# Patient Record
Sex: FEMALE | Race: WHITE | NOT HISPANIC OR LATINO | Employment: FULL TIME | URBAN - METROPOLITAN AREA
[De-identification: names, ages, dates, MRNs, and addresses within clinical notes are randomized per-mention and may not be internally consistent; named-entity substitution may affect disease eponyms.]

---

## 2018-01-22 ENCOUNTER — APPOINTMENT (OUTPATIENT)
Dept: ULTRASOUND IMAGING | Facility: CLINIC | Age: 58
End: 2018-01-22
Attending: EMERGENCY MEDICINE
Payer: COMMERCIAL

## 2018-01-22 ENCOUNTER — APPOINTMENT (OUTPATIENT)
Dept: MRI IMAGING | Facility: CLINIC | Age: 58
End: 2018-01-22
Attending: EMERGENCY MEDICINE
Payer: COMMERCIAL

## 2018-01-22 ENCOUNTER — HOSPITAL ENCOUNTER (EMERGENCY)
Facility: CLINIC | Age: 58
Discharge: HOME | End: 2018-01-22
Attending: STUDENT IN AN ORGANIZED HEALTH CARE EDUCATION/TRAINING PROGRAM | Admitting: STUDENT IN AN ORGANIZED HEALTH CARE EDUCATION/TRAINING PROGRAM
Payer: COMMERCIAL

## 2018-01-22 ENCOUNTER — APPOINTMENT (OUTPATIENT)
Dept: GENERAL RADIOLOGY | Facility: CLINIC | Age: 58
End: 2018-01-22
Attending: EMERGENCY MEDICINE
Payer: COMMERCIAL

## 2018-01-22 VITALS
SYSTOLIC BLOOD PRESSURE: 155 MMHG | HEART RATE: 84 BPM | OXYGEN SATURATION: 96 % | TEMPERATURE: 97.8 F | RESPIRATION RATE: 16 BRPM | WEIGHT: 140 LBS | DIASTOLIC BLOOD PRESSURE: 81 MMHG

## 2018-01-22 DIAGNOSIS — R29.898 WEAKNESS OF LEFT FOOT: ICD-10-CM

## 2018-01-22 DIAGNOSIS — R20.0 NUMBNESS OF LEFT FOOT: ICD-10-CM

## 2018-01-22 PROCEDURE — 99285 EMERGENCY DEPT VISIT HI MDM: CPT | Mod: Z6 | Performed by: EMERGENCY MEDICINE

## 2018-01-22 PROCEDURE — 93971 EXTREMITY STUDY: CPT | Mod: LT

## 2018-01-22 PROCEDURE — 99285 EMERGENCY DEPT VISIT HI MDM: CPT | Mod: 25 | Performed by: EMERGENCY MEDICINE

## 2018-01-22 PROCEDURE — 72148 MRI LUMBAR SPINE W/O DYE: CPT

## 2018-01-22 PROCEDURE — 73610 X-RAY EXAM OF ANKLE: CPT | Mod: LT

## 2018-01-22 NOTE — DISCHARGE INSTRUCTIONS
Return if symptoms worsen or new symptoms develop.  Follow-up with [facility] orthopedic spine this weak for further assessment and care. .  If increasing weakness numbness pain or other symptoms present please return for further assessment and care.

## 2018-01-22 NOTE — ED PROVIDER NOTES
History     Chief Complaint   Patient presents with     Ankle Pain     ankle given out X2 on Sunday. Now decreased strength.      R/o DVT     sent in by chiro for r/o DVT. Nonsmoker, no BCP. Sister with hx of DVT     HPI  Christie Jackson is a 57 year old female with a history of back pain who presents emergency department complaining of ankle weakness and foot numbness.  Patient states she got out of bed early Sunday morning and was going to the bathroom when her ankle gave way.  She was having trouble walking at that point and it gave way once again inverting on her.  Patient states she did not fall she caught herself but began having some numbness in her left calf and lateral aspect of her left foot.  It seems like Kerfoot continually inverts.  She denies any other trauma.  She did not hit her head she denies any neck pain.  She has not had any chest pain or shortness of breath.  Patient has not had back surgery and denies any numbness anywhere else in her body    Problem List:    There are no active problems to display for this patient.       Past Medical History:    No past medical history on file.    Past Surgical History:    No past surgical history on file.    Family History:    No family history on file.    Social History:  Marital Status:   [2]  Social History   Substance Use Topics     Smoking status: Not on file     Smokeless tobacco: Not on file     Alcohol use Not on file        Medications:      No current outpatient prescriptions on file.      Review of Systems   Constitutional: Positive for activity change. Negative for chills and fever.   HENT: Negative for congestion and trouble swallowing.    Eyes: Negative for visual disturbance.   Respiratory: Negative for cough, wheezing and stridor.    Cardiovascular: Negative for chest pain and leg swelling.   Gastrointestinal: Negative for abdominal pain.   Genitourinary: Negative for decreased urine volume and dysuria.   Musculoskeletal: Positive for  back pain. Negative for neck pain and neck stiffness.   Skin: Negative for rash.   Neurological: Positive for weakness and numbness. Negative for dizziness, syncope, facial asymmetry, speech difficulty and headaches.   Psychiatric/Behavioral: Negative for confusion.     Physical Exam   BP: 155/81  Pulse: 84  Temp: 97.8  F (36.6  C)  Resp: 16  Weight: 63.5 kg (140 lb)  SpO2: 96 %  Physical Exam           Physical Exam   Constitutional: She appears well-developed. No distress.   HENT:   Head: Normocephalic.   Mouth/Throat: Oropharynx is clear and moist.   Eyes: Conjunctivae are normal.   Neck: Normal range of motion. Neck supple.   Cardiovascular: Normal rate, regular rhythm, normal heart sounds and intact distal pulses.    No murmur heard.  Pulmonary/Chest: Effort normal and breath sounds normal. She has no wheezes. She has no rales.   Abdominal: Soft. Bowel sounds are normal. There is no tenderness. There is no rebound.   Musculoskeletal: Normal range of motion. She exhibits no tenderness.   Neurological: She is alert. She exhibits normal muscle tone.   Tenderness to palpation of the left lateral malleoli region.  There is numbness along the posterior aspect of the calf and lateral aspect of the left foot.  Patient's has slightly decreased strength in plantarflexion and dorsiflexion of the left ankle.  As compared to the right ankle.  Pulses are symmetrical.  No erythema swelling of the leg calf and ankle.  Patient is able to raise her left leg without difficulty.  She able flex and extend her left toe without significant difficulty.  She has some tenderness to palpation at the sacroiliac joint region in the left.  No midline back tenderness is noted.   Skin: Skin is warm and dry.   Psychiatric: She has a normal mood and affect.   Nursing note and vitals reviewed.         Physical Exam    ED Course     ED Course     Procedures               Critical Care time:  none               Results for orders placed or  performed during the hospital encounter of 01/22/18   Lumbar spine MRI w/o contrast - surgery >10 yrs or none    Narrative    MRI LUMBAR SPINE WITHOUT CONTRAST  1/22/2018  5:35 PM     HISTORY: Left lateral foot numbness and weakness. Symptoms since  Sunday morning at 3:00 AM. Leg pain, tingling, numbness and weakness.    TECHNIQUE: Multiplanar multisequence MRI of the lumbar spine without  contrast.    COMPARISON: None.    FINDINGS: The report is dictated assuming five lumbar-type vertebral  bodies, and radiographic correlation may be necessary. The distal  spinal cord and cauda equina appear normal.    T12-L1: Normal disc, facet joints, spinal canal and neural foramina.     L1-L2: Loss of disc height, mild broad-based posterior disc bulge.  Large anterior vertebral endplate osteophytes with prominent reactive  bone marrow edema in the vertebral endplates anteriorly and inferiorly  in L1 and anteriorly and superiorly in L2 vertebral bodies. No fluid  in the disc space to indicate infection. Schmorl's node in the left  side of the superior endplate of L2. Normal spinal canal, neural  foramina and facet joints.    L2-L3: Loss of disc height, mild circumferential disc bulge, anterior  vertebral endplate osteophytes and posterior vertebral endplate  remodeling. Slight impression on thecal sac. Normal neural foramina  and facet joints.    L3-L4: Loss of disc height, mild circumferential disc bulge, slight  impression on the thecal sac. No focal disc herniation. Normal neural  foramina and facet joints.    L4-L5: Mild circumferential disc bulge and decreased T2 signal in the  disc. Normal spinal canal, neural foramina and facet joints.    L5-S1: Central posterior small disc herniation extending inferior to  the disc level. No neural impingement because of small size of the  thecal sac at this level. Normal neural foramina and facet joints.    Paraspinous soft tissues: Normal.     Bone marrow: Benign hemangioma in the  right side of the L5 vertebral  body superiorly. Benign hemangioma in the right lateral aspect of the  L3 vertebral body. Benign hemangiomas in the left inferior aspect of  the T12 vertebral body.      Impression    IMPRESSION:   1. Multilevel degenerative disc disease as described above. This is  most severe anteriorly at L1 S2 where there is reactive bone marrow  edema in the adjacent vertebral endplates.  2. No significant neural impingement.  3. L5-S1 small central posterior disc herniation with no neural  impingement.  4. Multiple benign hemangiomas in the vertebral bodies.    SAMMI NAJERA MD   US Lower Extremity Venous Duplex Left    Narrative    VENOUS ULTRASOUND LEFT LEG  1/22/2018  5:16 PM     HISTORY: Left calf tenderness, above.    COMPARISON: None.    FINDINGS: Examination of the deep veins with graded compression and  color flow Doppler with spectral wave form analysis shows no evidence  of thrombus in the common femoral vein, femoral vein, popliteal vein  or calf veins. There is no venous insufficiency. No abnormalities were  seen in the lateral calf in the area of clinical concern.      Impression    IMPRESSION: No evidence of deep venous thrombosis.    SHIRLEY ANNE MD   Ankle XR, G/E 3 views, left    Narrative    LEFT ANKLE THREE VIEWS   1/22/2018 5:19 PM     HISTORY: Weakness in ankle.    COMPARISON: None.      Impression    IMPRESSION: Normal.    SAMMI NAJERA MD         Assessment and plan. Records  were reviewed.  X-ray of the ankle was obtained.  Ultrasound of the left lower extremity to rule out a DVT was obtained and a lumbar spine MRI was obtained.  The x-ray revealed no evidence of fracture dislocation or other abnormality.  The ultrasound revealed no evidence of DVT.  Findings were discussed with patient.    Due to her weakness upon dorsiflexion and the complaint of inversion of her ankle and calf an MRI scan of the lumbar spine was obtained.  This revealed multilevel degenerative disc  disease most severe anteriorly at L1 S2 where there is reactive bone marrow edema in the adjacent vertebral endplates there is no significant neural impingement present there is at L5-S1 small central posterior disc herniation with no neural impingement.  Findings were discussed in detail with patient.  No obvious neural sign of her symptoms at this point.  She is not having bowel or bladder dysfunction and is able ambulate.  She does have a ankle brace she is wearing which helps.  I may have the patient follow-up with orthopedic spine over the next few days.  I will treat her with steroids to see if this proves issues but she understands if symptoms worsen or new symptoms develop she immediately needs to return for further assessment and care.  Patient is in agreement with this plan.  We discussed that further workup including CT scan of the head would be warranted if symptoms did not improved or worsened.  She is in agreement with this plan and does not feel this is necessary at this time.     I have reviewed the nursing notes.    I have reviewed the findings, diagnosis, plan and need for follow up with the patient.       There are no discharge medications for this patient.      Final diagnoses:   Weakness of left foot   Numbness of left foot       1/22/2018   [facility] EMERGENCY DEPARTMENT     Thee Shore MD  01/23/18 2398

## 2018-01-22 NOTE — ED AVS SNAPSHOT
[facility] Emergency Department    [address] [address]    Phone:  [phone]    Fax:  [phone]                                       Christie Jackson   MRN: [id number]    Department:  [facility] Emergency Department   Date of Visit:  1/22/2018           After Visit Summary Signature Page     I have received my discharge instructions, and my questions have been answered. I have discussed any challenges I see with this plan with the nurse or doctor.    ..........................................................................................................................................  Patient/Patient Representative Signature      ..........................................................................................................................................  Patient Representative Print Name and Relationship to Patient    ..................................................               ................................................  Date                                            Time    ..........................................................................................................................................  Reviewed by Signature/Title    ...................................................              ..............................................  Date                                                            Time

## 2018-01-22 NOTE — ED NOTES
Complaining of left ankle weakness and numbness. The patient states she has no injury, sudden  Onset at 0300 Sunday morning, falling due to ankle weakness.

## 2018-01-22 NOTE — ED AVS SNAPSHOT
[facility] Emergency Department    [address] [address]    Phone:  [phone]    Fax:  [phone]                                       Christie Jackson   MRN: [id number]    Department:  [facility] Emergency Department   Date of Visit:  1/22/2018           Patient Information     Date Of Birth          1960        Your diagnoses for this visit were:     Weakness of left foot     Numbness of left foot        You were seen by Barrie Joshi DO and Thee Shore MD.      Follow-up Information     Follow up with Millicent Dalton MD.    Specialty:  Family Practice    Why:  As needed    Contact information:    [facility]  [address] [id number] [phone]          Follow up with [facility] Emergency Department.    Specialty:  EMERGENCY MEDICINE    Why:  If symptoms worsen    Contact information:    [address] [id number] [phone]    Additional information:    The medical center is located at   [address] (between [address] in [place], [place] miles north   of [place]).        Discharge Instructions       Return if symptoms worsen or new symptoms develop.  Follow-up with [facility] orthopedic spine this weak for further assessment and care. .  If increasing weakness numbness pain or other symptoms present please return for further assessment and care.    24 Hour Appointment Hotline       To make an appointment at any [facility] clinic, call [phone] ([phone]). If you don't have a family doctor or clinic, we will help you find one. [facility] clinics are conveniently located to serve the needs of you and your family.          ED Discharge Orders     ORTHO  REFERRAL       [facility] is referring you to the Orthopedic  Services at [facility].       The  Representative will assist you in the coordination of your  Orthopedic and Musculoskeletal Care as prescribed by your physician.    The  Representative will call you within 1 business day to help schedule your appointment, or you may contact the  Representative at:    All areas ~ [phone]     Type of Referral : Spine: Lumbar  **Choose Medical Spine Specialist (unless patient was seen by a Medical Spine Specialist within the past 6 months).**  Surgical Evaluation is advised if the patient presents with one or more of the following red flags: Evidence of Spinal Tumor, Infection or Fracture, Cauda Equina Syndrome, Sudden or Progressive Weakness, Loss of Bowel or Bladder Control, or any other documented emergent neurological condition resulting from a Lumbar Spinal Condition. Spine Surgeon        Timeframe requested: 1-2 days    Coverage of these services is subject to the terms and limitations of your health insurance plan.  Please call member services at your health plan with any benefit or coverage questions.      If X-rays, CT or MRI's have been performed, please contact the facility where they were done to arrange for , prior to your scheduled appointment.  Please bring this referral request to your appointment and present it to your specialist.                     Review of your medicines      Notice     You have not been prescribed any medications.            Procedures and tests performed during your visit     Ankle XR, G/E 3 views, left    Lumbar spine MRI w/o contrast - surgery >10 yrs or none    US Lower Extremity Venous Duplex Left      Orders Needing Specimen Collection     None      Pending Results     Date and Time Order Name Status Description    1/22/2018 1607 Ankle XR, G/E 3 views, left Preliminary     1/22/2018 1603 Lumbar spine MRI w/o contrast - surgery >10 yrs or none Preliminary             Pending Culture Results     No orders found from 1/20/2018 to 1/23/2018.            Pending Results Instructions     If you had any lab  results that were not finalized at the time of your Discharge, you can call the ED Lab Result RN at [phone]. You will be contacted by this team for any positive Lab results or changes in treatment. The nurses are available 7 days a week from 10A to 6:30P.  You can leave a message 24 hours per day and they will return your call.        Test Results From Your Hospital Stay        1/22/2018  5:50 PM      Narrative     MRI LUMBAR SPINE WITHOUT CONTRAST  1/22/2018  5:35 PM     HISTORY: Left lateral foot numbness and weakness. Symptoms since  Sunday morning at 3:00 AM. Leg pain, tingling, numbness and weakness.    TECHNIQUE: Multiplanar multisequence MRI of the lumbar spine without  contrast.    COMPARISON: None.    FINDINGS: The report is dictated assuming five lumbar-type vertebral  bodies, and radiographic correlation may be necessary. The distal  spinal cord and cauda equina appear normal.    T12-L1: Normal disc, facet joints, spinal canal and neural foramina.     L1-L2: Loss of disc height, mild broad-based posterior disc bulge.  Large anterior vertebral endplate osteophytes with prominent reactive  bone marrow edema in the vertebral endplates anteriorly and inferiorly  in L1 and anteriorly and superiorly in L2 vertebral bodies. No fluid  in the disc space to indicate infection. Schmorl's node in the left  side of the superior endplate of L2. Normal spinal canal, neural  foramina and facet joints.    L2-L3: Loss of disc height, mild circumferential disc bulge, anterior  vertebral endplate osteophytes and posterior vertebral endplate  remodeling. Slight impression on thecal sac. Normal neural foramina  and facet joints.    L3-L4: Loss of disc height, mild circumferential disc bulge, slight  impression on the thecal sac. No focal disc herniation. Normal neural  foramina and facet joints.    L4-L5: Mild circumferential disc bulge and decreased T2 signal in the  disc. Normal spinal canal, neural foramina and facet  joints.    L5-S1: Central posterior small disc herniation extending inferior to  the disc level. No neural impingement because of small size of the  thecal sac at this level. Normal neural foramina and facet joints.    Paraspinous soft tissues: Normal.     Bone marrow: Benign hemangioma in the right side of the L5 vertebral  body superiorly. Benign hemangioma in the right lateral aspect of the  L3 vertebral body. Benign hemangiomas in the left inferior aspect of  the T12 vertebral body.        Impression     IMPRESSION:   1. Multilevel degenerative disc disease as described above. This is  most severe anteriorly at L1 S2 where there is reactive bone marrow  edema in the adjacent vertebral endplates.  2. No significant neural impingement.  3. L5-S1 small central posterior disc herniation with no neural  impingement.  4. Multiple benign hemangiomas in the vertebral bodies.         1/22/2018  5:42 PM      Narrative     VENOUS ULTRASOUND LEFT LEG  1/22/2018  5:16 PM     HISTORY: Left calf tenderness, above.    COMPARISON: None.    FINDINGS: Examination of the deep veins with graded compression and  color flow Doppler with spectral wave form analysis shows no evidence  of thrombus in the common femoral vein, femoral vein, popliteal vein  or calf veins. There is no venous insufficiency. No abnormalities were  seen in the lateral calf in the area of clinical concern.        Impression     IMPRESSION: No evidence of deep venous thrombosis.    SHIRLEY ANNE MD         1/22/2018  5:22 PM      Narrative     LEFT ANKLE THREE VIEWS   1/22/2018 5:19 PM     HISTORY: Weakness in ankle.    COMPARISON: None.        Impression     IMPRESSION: Normal.                Thank you for choosing [facility]       Thank you for choosing [facility] for your care. Our goal is always to provide you with excellent care. Hearing back from our patients is one way we can continue to improve our services. Please take a few minutes to complete the written  "survey that you may receive in the mail after you visit with us. Thank you!        [employer] Information     [employer] lets you send messages to your doctor, view your test results, renew your prescriptions, schedule appointments and more. To sign up, go to [email or web address] . Click on \"Log in\" on the left side of the screen, which will take you to the Welcome page. Then click on \"Sign up Now\" on the right side of the page.     You will be asked to enter the access code listed below, as well as some personal information. Please follow the directions to create your username and password.     Your access code is: [email or web address]  Expires: 2018  5:59 PM     Your access code will  in 90 days. If you need help or a new code, please call your [facility] clinic or [phone].        Care EveryWhere ID     This is your Care EveryWhere ID. This could be used by other organizations to access your [facility] medical records  [id number]        Equal Access to Services     JOURDAN CALDERON : Hadraúl weiss Sogeovany, waaxda luqadaha, qaybta kaalmareshma chua, yunior maldonado . So [facility] [phone].    ATENCIÓN: Si habla español, tiene a berry disposición servicios gratuitos de asistencia lingüística. Llame al [phone].    We comply with applicable federal civil rights laws and [place] laws. We do not discriminate on the basis of race, color, national origin, age, disability, sex, sexual orientation, or gender identity.            After Visit Summary       This is your record. Keep this with you and show to your community pharmacist(s) and doctor(s) at your next visit.                  "

## 2018-01-23 ASSESSMENT — ENCOUNTER SYMPTOMS
HEADACHES: 0
DIZZINESS: 0
BACK PAIN: 1
COUGH: 0
FEVER: 0
WHEEZING: 0
NECK PAIN: 0
STRIDOR: 0
FACIAL ASYMMETRY: 0
CONFUSION: 0
SPEECH DIFFICULTY: 0
ACTIVITY CHANGE: 1
WEAKNESS: 1
NECK STIFFNESS: 0
ABDOMINAL PAIN: 0
NUMBNESS: 1
DYSURIA: 0
CHILLS: 0
TROUBLE SWALLOWING: 0

## 2020-12-16 ENCOUNTER — VIRTUAL VISIT (OUTPATIENT)
Dept: FAMILY MEDICINE | Facility: OTHER | Age: 60
End: 2020-12-16
Payer: COMMERCIAL

## 2020-12-16 DIAGNOSIS — Z20.822 ENCOUNTER FOR LABORATORY TESTING FOR COVID-19 VIRUS: Primary | ICD-10-CM

## 2020-12-16 PROCEDURE — U0003 INFECTIOUS AGENT DETECTION BY NUCLEIC ACID (DNA OR RNA); SEVERE ACUTE RESPIRATORY SYNDROME CORONAVIRUS 2 (SARS-COV-2) (CORONAVIRUS DISEASE [COVID-19]), AMPLIFIED PROBE TECHNIQUE, MAKING USE OF HIGH THROUGHPUT TECHNOLOGIES AS DESCRIBED BY CMS-2020-01-R: HCPCS | Performed by: FAMILY MEDICINE

## 2020-12-16 PROCEDURE — 99421 OL DIG E/M SVC 5-10 MIN: CPT | Performed by: PREVENTIVE MEDICINE

## 2020-12-16 NOTE — PROGRESS NOTES
"Date: 2020 11:40:46  Clinician: Poncho Arnold  Clinician NPI: [id number]  Patient: Christie Jackson  Patient : 1960  Patient Address: [address] [place] [address]  Patient Phone: [phone]  Visit Protocol: URI  Patient Summary:  Christie is a 60 year old ( : 1960 ) female who initiated a [facility] Visit for COVID-19 (Coronavirus) evaluation and screening. When asked the question \"Please sign me up to receive news, health information and promotions from [facility].\", Christie responded \"Yes\".    When asked when her symptoms started, Christie reported that she does not have any symptoms.   She denies having recent facial or sinus surgery in the past 60 days and taking antibiotic medication in the past month.    Pertinent COVID-19 (Coronavirus) information  Christie does not work or volunteer as healthcare worker or a . In the past 14 days, Christie has not worked or volunteered at a healthcare facility or group living setting.   In the past 14 days, she also has not lived in a congregate living setting.   Christie has had a close contact with a laboratory-confirmed COVID-19 patient in the last 14 days. She was exposed at her work. 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   Christie is not living in the same household with the COVID-19 positive patient. She was in an enclosed space for greater than 15 minutes with the COVID-19 patient.   During the encounter, neither were wearing masks.   Christie has not been tested for COVID-19.   Pertinent medical history  She has not been told by her provider to avoid NSAIDs.   Christie does not get yeast infections when she takes antibiotics.   Christie does not have diabetes. She denies having immunosuppressive conditions (e.g., chemotherapy, HIV, organ transplant, long-term use of steroids or " other immunosuppressive medications, splenectomy). She denies having congestive heart failure and severe COPD. She does not have asthma.   Christie does not need a return to work/school note.   Christie does not smoke or use smokeless tobacco.   Weight: 138 lbs  Reason for repeat visit for the same protocol within 24 hours:  I have directly exposed by a co- worker.  See the History of referred by protocol and completed visits section for details on previous visits (visits currently in queue to be diagnosed will not appear in this section).    MEDICATIONS: No current medications, ALLERGIES: NKDA  Clinician Response:  Dear Christie,   Based on your exposure to COVID-19 (coronavirus), we would like to test you for this virus.  1. Please call [phone] to schedule your visit. Explain that you were referred by [facility] to have a COVID-19 test. Be ready to share your [facility] visit ID number.  * If you need to schedule in [facility] please call [phone] or for [employer] employees please call [phone].   * If you need to schedule in the [place] area please call [phone]. [employer] employees call [phone].   The following will serve as your written order for this COVID Test, ordered by me, for the indication of suspected COVID [Z20.828]: The test will be ordered in [employer], our electronic health record, after you are scheduled. It will show as ordered and authorized by Alejandro Newton MD.  Order: COVID-19 (coronavirus) PCR for ASYMPTOMATIC EXPOSURE testing from [facility].   If you know you have had close contact with someone who tested positive, you should be quarantined for 14 days after this exposure. You should stay in quarantine for the14 days even if the covid test is negative, the optimal time to test after exposure is 5-7 days from the exposure  Quarantine means   What should I do?  For safety, it's very important to follow these rules. Do this for 14 days after the date you were last exposed to the virus..  Stay home and  away from others. Don't go to school or anywhere else. Generally quarantine means staying home from work but there are some exceptions to this. Please contact your workplace.   No hugging, kissing or shaking hands.  Don't let anyone visit.  Cover your mouth and nose with a mask, tissue or washcloth to avoid spreading germs.  Wash your hands and face often. Use soap and water.  What are the symptoms of COVID-19?  The most common symptoms are cough, fever and trouble breathing. Less common symptoms include headache, body aches, fatigue (feeling very tired), chills, sore throat, stuffy or runny nose, diarrhea (loose poop), loss of taste or smell, belly pain, and nausea or vomiting (feeling sick to your stomach or throwing up).  After 14 days, if you have still don't have symptoms, you likely don't have this virus.  If you develop symptoms, follow these guidelines.  If you're normally healthy: Please start another [facility] visit to report your symptoms. Go to [email or web address].  If you have a serious health problem (like cancer, heart failure, an organ transplant or kidney disease): Call your specialty clinic. Let them know that you might have COVID-19.  2. When it's time for your COVID test:  Stay at least 6 feet away from others. (If someone will drive you to your test, stay in the backseat, as far away from the  as you can.)  Cover your mouth and nose with a mask, tissue or washcloth.  Go straight to the testing site. Don't make any stops on the way there or back.  Please note  Caregivers in these groups are at risk for severe illness due to COVID-19:  o People 65 years and older  o People who live in a nursing home or long-term care facility  o People with chronic disease (lung, heart, cancer, diabetes, kidney, liver, immunologic)  o People who have a weakened immune system, including those who:  Are in cancer treatment  Take medicine that weakens the immune system, such as corticosteroids  Had a bone marrow or organ  transplant  Have an immune deficiency  Have poorly controlled HIV or AIDS  Are obese (body mass index of 40 or higher)  Smoke regularly  Where can I get more information?  [facility] -- About COVID-19: [email or web address]  CDC -- What to Do If You're Sick: www.cdc.gov/coronavirus/2019-ncov/about/steps-when-sick.html  CDC -- Ending Home Isolation: www.cdc.gov/coronavirus/2019-ncov/hcp/disposition-in-home-patients.html  CDC -- Caring for Someone: www.cdc.gov/coronavirus/2019-ncov/if-you-are-sick/care-for-someone.html  [facility] -- Interim Guidance for Hospital Discharge to Home: www.[facility].mn./diseases/coronavirus/hcp/hospdischarge.pdf  [facility] clinical trials (COVID-19 research studies): clinicalaffairs.[facility]-clinical-trials  Below are the COVID-19 hotlines at the [place] Department of Health ([facility]). Interpreters are available.  For health questions: Call 694-380-9272 or 1-789.571.2096 (7 a.m. to 7 p.m.)  For questions about schools and childcare: Call 994-304-9206 or 1-393.261.8787 (7 a.m. to 7 p.m.)    Diagnosis: Contact with and (suspected) exposure to other viral communicable diseases  Diagnosis ICD: Z20.828

## 2020-12-17 LAB
SARS-COV-2 RNA SPEC QL NAA+PROBE: NOT DETECTED
SPECIMEN SOURCE: NORMAL

## 2021-01-14 ENCOUNTER — VIRTUAL VISIT (OUTPATIENT)
Dept: FAMILY MEDICINE | Facility: OTHER | Age: 61
End: 2021-01-14
Payer: COMMERCIAL

## 2021-01-14 PROCEDURE — 99421 OL DIG E/M SVC 5-10 MIN: CPT | Performed by: PHYSICIAN ASSISTANT

## 2021-01-14 NOTE — PROGRESS NOTES
"Date: 2021 14:05:21  Clinician: Freddy Villagomez  Clinician NPI: [id number]  Patient: Christie Jackson  Patient : 1960  Patient Address: [address] [place], [place] [id number]  Patient Phone: [phone]  Visit Protocol: URI  Patient Summary:  Christie is a 60 year old ( : 1960 ) female who initiated a [facility] Visit for COVID-19 (Coronavirus) evaluation and screening. When asked the question \"Please sign me up to receive news, health information and promotions from [facility].\", Christie responded \"Yes\".    When asked when her symptoms started, Christie reported that she does not have any symptoms.   She denies having recent facial or sinus surgery in the past 60 days and taking antibiotic medication in the past month.    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 In the past 14 days, Christie has not worked or volunteered at a healthcare facility or group living setting.   In the past 14 days, she also has not lived in a congregate living setting.   Christie has not had a close contact with a laboratory-confirmed COVID-19 patient within the last 14 days.    Christie has been tested for COVID-19.      Date(s) of her COVID-19 test as reported by the patient (free text): About a month ago       Result of COVID-19 test as reported by the patient (free text): Neg       Type of test as reported by the patient (free text): Nasal        Christie has not received a COVID-19 vaccine.   Pertinent medical history  She has not been told by her provider to avoid NSAIDs.   Christie does not get yeast infections when she takes antibiotics.   Christie does not have diabetes. She denies having immunosuppressive conditions (e.g., chemotherapy, HIV, organ transplant, long-term use of steroids or other immunosuppressive medications, splenectomy). She denies having congestive heart failure and severe COPD. She does not have asthma.   Christie does not need a " return to work/school note.   Christie does not smoke or use smokeless tobacco.   Weight: 139 lbs    MEDICATIONS: No current medications, ALLERGIES: NKDA  Clinician Response:  Dear Christie,   Based on your exposure to COVID-19 (coronavirus), we would like to test you for this virus.  1. Please call [phone] to schedule your visit. Explain that you were referred by [facility] to have a COVID-19 test. Be ready to share your [facility] visit ID number.  * If you need to schedule in [facility] please call [phone] or for [employer] employees please call [phone].   * If you need to schedule in the [place] area please call [phone]. [employer] employees call [phone].   The following will serve as your written order for this COVID Test, ordered by me, for the indication of suspected COVID [Z20.828]: The test will be ordered in [employer], our electronic health record, after you are scheduled. It will show as ordered and authorized by Alejandro Newton MD.  Order: COVID-19 (coronavirus) PCR for ASYMPTOMATIC EXPOSURE testing from [facility].   If you know you have had close contact with someone who tested positive, you should be quarantined for 14 days after this exposure. You should stay in quarantine for the14 days even if the covid test is negative, the optimal time to test after exposure is 5-7 days from the exposure  Quarantine means   What should I do?  For safety, it's very important to follow these rules. Do this for 14 days after the date you were last exposed to the virus..  Stay home and away from others. Don't go to school or anywhere else. Generally quarantine means staying home from work but there are some exceptions to this. Please contact your workplace.   No hugging, kissing or shaking hands.  Don't let anyone visit.  Cover your mouth and nose with a mask, tissue or washcloth to avoid spreading germs.  Wash your hands and face often. Use soap and water.  What are the symptoms of COVID-19?  The most common symptoms are  cough, fever and trouble breathing. Less common symptoms include headache, body aches, fatigue (feeling very tired), chills, sore throat, stuffy or runny nose, diarrhea (loose poop), loss of taste or smell, belly pain, and nausea or vomiting (feeling sick to your stomach or throwing up).  After 14 days, if you have still don't have symptoms, you likely don't have this virus.  If you develop symptoms, follow these guidelines.  If you're normally healthy: Please start another [facility] visit to report your symptoms. Go to [email or web address].  If you have a serious health problem (like cancer, heart failure, an organ transplant or kidney disease): Call your specialty clinic. Let them know that you might have COVID-19.  2. When it's time for your COVID test:  Stay at least 6 feet away from others. (If someone will drive you to your test, stay in the backseat, as far away from the  as you can.)  Cover your mouth and nose with a mask, tissue or washcloth.  Go straight to the testing site. Don't make any stops on the way there or back.  Please note  Caregivers in these groups are at risk for severe illness due to COVID-19:  o People 65 years and older  o People who live in a nursing home or long-term care facility  o People with chronic disease (lung, heart, cancer, diabetes, kidney, liver, immunologic)  o People who have a weakened immune system, including those who:  Are in cancer treatment  Take medicine that weakens the immune system, such as corticosteroids  Had a bone marrow or organ transplant  Have an immune deficiency  Have poorly controlled HIV or AIDS  Are obese (body mass index of 40 or higher)  Smoke regularly  Where can I get more information?   [employer] [facility] -- About COVID-19: [email or web address]  CDC -- What to Do If You're Sick: www.cdc.gov/coronavirus/2019-ncov/about/steps-when-sick.html  CDC -- Ending Home Isolation: www.cdc.gov/coronavirus/2019-ncov/hcp/disposition-in-home-patients.html  [facility]  -- Caring for Someone: www.cdc.gov/coronavirus/2019-ncov/if-you-are-sick/care-for-someone.html  [facility] -- Interim Guidance for Hospital Discharge to Home: www.health.[facility].mn.us/diseases/coronavirus/hcp/hospdischarge.pdf  [facility] clinical trials (COVID-19 research studies): clinicalaffairs.[facility]-clinical-trials  Below are the COVID-19 hotlines at the [place] Department of Health ([facility]). Interpreters are available.  For health questions: Call 701-767-6778 or 1-288.466.4281 (7 a.m. to 7 p.m.)  For questions about schools and childcare: Call 153-014-2798 or 1-232.700.2244 (7 a.m. to 7 p.m.)    Diagnosis: Contact with and (suspected) exposure to other viral communicable diseases  Diagnosis ICD: Z20.828

## 2021-01-15 ENCOUNTER — HEALTH MAINTENANCE LETTER (OUTPATIENT)
Age: 61
End: 2021-01-15

## 2021-09-18 ENCOUNTER — HEALTH MAINTENANCE LETTER (OUTPATIENT)
Age: 61
End: 2021-09-18

## 2022-02-27 ENCOUNTER — HEALTH MAINTENANCE LETTER (OUTPATIENT)
Age: 62
End: 2022-02-27

## 2022-05-22 ENCOUNTER — HOSPITAL ENCOUNTER (EMERGENCY)
Facility: CLINIC | Age: 62
Discharge: HOME | End: 2022-05-22
Attending: PHYSICIAN ASSISTANT | Admitting: PHYSICIAN ASSISTANT
Payer: COMMERCIAL

## 2022-05-22 VITALS
HEART RATE: 90 BPM | RESPIRATION RATE: 14 BRPM | TEMPERATURE: 98 F | SYSTOLIC BLOOD PRESSURE: 148 MMHG | OXYGEN SATURATION: 97 % | DIASTOLIC BLOOD PRESSURE: 79 MMHG | WEIGHT: 138 LBS

## 2022-05-22 DIAGNOSIS — N39.0 URINARY TRACT INFECTION: ICD-10-CM

## 2022-05-22 LAB
ALBUMIN UR-MCNC: NEGATIVE MG/DL
APPEARANCE UR: ABNORMAL
BACTERIA #/AREA URNS HPF: ABNORMAL /HPF
BILIRUB UR QL STRIP: NEGATIVE
COLOR UR AUTO: YELLOW
GLUCOSE UR STRIP-MCNC: NEGATIVE MG/DL
HGB UR QL STRIP: ABNORMAL
KETONES UR STRIP-MCNC: NEGATIVE MG/DL
LEUKOCYTE ESTERASE UR QL STRIP: ABNORMAL
MUCOUS THREADS #/AREA URNS LPF: PRESENT /LPF
NITRATE UR QL: NEGATIVE
PH UR STRIP: 7 [PH] (ref 5–7)
RBC URINE: 1 /HPF
SP GR UR STRIP: 1 (ref 1–1.03)
SQUAMOUS EPITHELIAL: <1 /HPF
UROBILINOGEN UR STRIP-MCNC: NORMAL MG/DL
WBC CLUMPS #/AREA URNS HPF: PRESENT /HPF
WBC URINE: 115 /HPF

## 2022-05-22 PROCEDURE — 99213 OFFICE O/P EST LOW 20 MIN: CPT | Performed by: PHYSICIAN ASSISTANT

## 2022-05-22 PROCEDURE — G0463 HOSPITAL OUTPT CLINIC VISIT: HCPCS | Performed by: PHYSICIAN ASSISTANT

## 2022-05-22 PROCEDURE — 81001 URINALYSIS AUTO W/SCOPE: CPT | Performed by: PHYSICIAN ASSISTANT

## 2022-05-22 PROCEDURE — 87086 URINE CULTURE/COLONY COUNT: CPT | Performed by: PHYSICIAN ASSISTANT

## 2022-05-22 RX ORDER — HYDROCHLOROTHIAZIDE 25 MG/1
TABLET ORAL
COMMUNITY
Start: 2022-04-15

## 2022-05-22 RX ORDER — LISINOPRIL 5 MG/1
TABLET ORAL
COMMUNITY
Start: 2022-04-15

## 2022-05-22 RX ORDER — CIPROFLOXACIN 500 MG/1
500 TABLET, FILM COATED ORAL 2 TIMES DAILY
Qty: 10 TABLET | Refills: 0 | Status: SHIPPED | OUTPATIENT
Start: 2022-05-22

## 2022-05-22 RX ORDER — CETIRIZINE HYDROCHLORIDE 10 MG/1
10 TABLET ORAL
COMMUNITY
Start: 2021-01-07

## 2022-05-22 ASSESSMENT — ENCOUNTER SYMPTOMS
CONSTITUTIONAL NEGATIVE: 1
FEVER: 0
GASTROINTESTINAL NEGATIVE: 1
DYSURIA: 1
FREQUENCY: 1

## 2022-05-22 NOTE — ED PROVIDER NOTES
History     Chief Complaint   Patient presents with     UTI     HPI  Christie Jackson is a 62 year old female who presents with complaints of dysuria and increased urinary urgency and frequency for the past 3 days.  Patient states her symptoms feel consistent with her past urinary tract infections.  She states she had preceding diarrhea that has since resolved prior to her urinary tract infection symptoms starting.  She has prophylactic antibiotics (Bactrim) at home that she started taking and has been taking these daily without improvement.  She states this is an  prescription however.  Denies fevers, chills, nausea, vomiting, abdominal pain, flank pain, or hematuria.      Allergies:  Allergies   Allergen Reactions     Gramineae Pollens Other (See Comments)     Mold        Problem List:    There are no problems to display for this patient.       Past Medical History:    No past medical history on file.    Past Surgical History:    No past surgical history on file.    Family History:    No family history on file.    Social History:  Marital Status:   [2]        Medications:    ciprofloxacin (CIPRO) 500 MG tablet  hydrochlorothiazide (HYDRODIURIL) 25 MG tablet  lisinopril (ZESTRIL) 5 MG tablet  cetirizine (ZYRTEC) 10 MG tablet  omeprazole (PRILOSEC) 20 MG DR capsule          Review of Systems   Constitutional: Negative.  Negative for fever.   Gastrointestinal: Negative.    Genitourinary: Positive for dysuria, frequency and urgency.   Skin: Negative.    All other systems reviewed and are negative.      Physical Exam   BP: (!) 148/79  Pulse: 90  Temp: 98  F (36.7  C)  Resp: 14  Weight: 62.6 kg (138 lb)  SpO2: 97 %      Physical Exam  Constitutional:       General: She is not in acute distress.     Appearance: Normal appearance. She is not ill-appearing, toxic-appearing or diaphoretic.   HENT:      Head: Normocephalic and atraumatic.      Nose: Nose normal.      Mouth/Throat:      Mouth: Mucous membranes are  moist.   Eyes:      Conjunctiva/sclera: Conjunctivae normal.   Pulmonary:      Effort: Pulmonary effort is normal.   Abdominal:      General: There is no distension.      Palpations: Abdomen is soft.      Tenderness: There is no abdominal tenderness. There is no right CVA tenderness, left CVA tenderness or guarding.   Musculoskeletal:         General: Normal range of motion.      Cervical back: Normal range of motion and neck supple.   Skin:     General: Skin is warm and dry.   Neurological:      Mental Status: She is alert.         ED Course                 Procedures    Results for orders placed or performed during the hospital encounter of 05/22/22 (from the past 24 hour(s))   UA with Microscopic reflex to Culture    Specimen: Urine, Midstream   Result Value Ref Range    Color Urine Yellow Colorless, Straw, Light Yellow, Yellow    Appearance Urine Slightly Cloudy (A) Clear    Glucose Urine Negative Negative mg/dL    Bilirubin Urine Negative Negative    Ketones Urine Negative Negative mg/dL    Specific Gravity Urine 1.003 1.003 - 1.035    Blood Urine Large (A) Negative    pH Urine 7.0 5.0 - 7.0    Protein Albumin Urine Negative Negative mg/dL    Urobilinogen Urine Normal Normal, 2.0 mg/dL    Nitrite Urine Negative Negative    Leukocyte Esterase Urine Large (A) Negative    Bacteria Urine Few (A) None Seen /HPF    WBC Clumps Urine Present (A) None Seen /HPF    Mucus Urine Present (A) None Seen /LPF    RBC Urine 1 <=2 /HPF    WBC Urine 115 (H) <=5 /HPF    Squamous Epithelials Urine <1 <=1 /HPF    Narrative    Urine Culture ordered based on laboratory criteria       Medications - No data to display    Assessments & Plan (with Medical Decision Making)     Pt is a 62 year old female who presents with complaints of dysuria and increased urinary urgency and frequency for the past 3 days.  Patient states her symptoms feel consistent with her past urinary tract infections.  She states she had preceding diarrhea that has  since resolved prior to her urinary tract infection symptoms starting.  She has prophylactic antibiotics (Bactrim) at home that she started taking and has been taking these daily without improvement.  No systemic symptoms.    Pt is afebrile on arrival.  Exam as above.  UA is positive for large leuk esterase, WBC clumps, and 115 WBCs.  Urine was sent for culture.  Discussed results with patient.  Return precautions were reviewed.  Hand-outs were provided.    Patient was sent with Ciprofloxacin and was instructed to follow-up with PCP in 3-5 days for continued care and management or sooner if new or worsening symptoms.  She is to return to the ED for persistent and/or worsening symptoms.  Patient expressed understanding of the diagnosis and plan and was discharged home in good condition.    I have reviewed the nursing notes.    I have reviewed the findings, diagnosis, plan and need for follow up with the patient.    Discharge Medication List as of 5/22/2022  6:24 PM      START taking these medications    Details   ciprofloxacin (CIPRO) 500 MG tablet Take 1 tablet (500 mg) by mouth 2 times daily, Disp-10 tablet, R-0, [facility]             Final diagnoses:   Urinary tract infection       5/22/2022   [facility] EMERGENCY DEPT      Disclaimer:  This note consists of symbols derived from keyboarding, dictation and/or voice recognition software.  As a result, there may be errors in the script that have gone undetected.  Please consider this when interpreting information found in this chart.     Yany Cano PA-C  05/22/22 [id number]

## 2022-05-22 NOTE — ED TRIAGE NOTES
History of UTI, frequency and urgency, started old antibiotics prophalactic     Triage Assessment     Row Name 05/22/22 [id number]       Triage Assessment (Adult)    Airway WDL WDL       Respiratory WDL    Respiratory WDL WDL       Skin Circulation/Temperature WDL    Skin Circulation/Temperature WDL WDL

## 2022-05-24 LAB — BACTERIA UR CULT: NORMAL

## 2022-11-19 ENCOUNTER — HEALTH MAINTENANCE LETTER (OUTPATIENT)
Age: 62
End: 2022-11-19

## 2023-04-09 ENCOUNTER — HEALTH MAINTENANCE LETTER (OUTPATIENT)
Age: 63
End: 2023-04-09

## 2024-08-19 ENCOUNTER — HOSPITAL ENCOUNTER (EMERGENCY)
Facility: CLINIC | Age: 64
Discharge: HOME | End: 2024-08-19
Attending: STUDENT IN AN ORGANIZED HEALTH CARE EDUCATION/TRAINING PROGRAM | Admitting: STUDENT IN AN ORGANIZED HEALTH CARE EDUCATION/TRAINING PROGRAM
Payer: COMMERCIAL

## 2024-08-19 ENCOUNTER — APPOINTMENT (OUTPATIENT)
Dept: CT IMAGING | Facility: CLINIC | Age: 64
End: 2024-08-19
Attending: STUDENT IN AN ORGANIZED HEALTH CARE EDUCATION/TRAINING PROGRAM
Payer: COMMERCIAL

## 2024-08-19 VITALS
TEMPERATURE: 98.1 F | BODY MASS INDEX: 23.56 KG/M2 | HEIGHT: 64 IN | RESPIRATION RATE: 13 BRPM | HEART RATE: 91 BPM | DIASTOLIC BLOOD PRESSURE: 82 MMHG | SYSTOLIC BLOOD PRESSURE: 131 MMHG | WEIGHT: 138 LBS | OXYGEN SATURATION: 97 %

## 2024-08-19 DIAGNOSIS — K57.32 DIVERTICULITIS OF COLON: ICD-10-CM

## 2024-08-19 LAB
ALBUMIN SERPL BCG-MCNC: 4.3 G/DL (ref 3.5–5.2)
ALBUMIN UR-MCNC: NEGATIVE MG/DL
ALP SERPL-CCNC: 122 U/L (ref 40–150)
ALT SERPL W P-5'-P-CCNC: 139 U/L (ref 0–50)
ANION GAP SERPL CALCULATED.3IONS-SCNC: 11 MMOL/L (ref 7–15)
APPEARANCE UR: CLEAR
AST SERPL W P-5'-P-CCNC: 111 U/L (ref 0–45)
BASOPHILS # BLD AUTO: 0 10E3/UL (ref 0–0.2)
BASOPHILS NFR BLD AUTO: 0 %
BILIRUB SERPL-MCNC: 0.6 MG/DL
BILIRUB UR QL STRIP: NEGATIVE
BUN SERPL-MCNC: 15.2 MG/DL (ref 8–23)
CALCIUM SERPL-MCNC: 9.7 MG/DL (ref 8.8–10.4)
CHLORIDE SERPL-SCNC: 97 MMOL/L (ref 98–107)
COLOR UR AUTO: YELLOW
CREAT SERPL-MCNC: 0.7 MG/DL (ref 0.51–0.95)
EGFRCR SERPLBLD CKD-EPI 2021: >90 ML/MIN/1.73M2
EOSINOPHIL # BLD AUTO: 0.1 10E3/UL (ref 0–0.7)
EOSINOPHIL NFR BLD AUTO: 1 %
ERYTHROCYTE [DISTWIDTH] IN BLOOD BY AUTOMATED COUNT: 11.9 % (ref 10–15)
GLUCOSE SERPL-MCNC: 143 MG/DL (ref 70–99)
GLUCOSE UR STRIP-MCNC: NEGATIVE MG/DL
HCO3 SERPL-SCNC: 30 MMOL/L (ref 22–29)
HCT VFR BLD AUTO: 42 % (ref 35–47)
HGB BLD-MCNC: 14.1 G/DL (ref 11.7–15.7)
HGB UR QL STRIP: NEGATIVE
HOLD SPECIMEN: NORMAL
IMM GRANULOCYTES # BLD: 0 10E3/UL
IMM GRANULOCYTES NFR BLD: 0 %
INR PPP: 1.03 (ref 0.85–1.15)
KETONES UR STRIP-MCNC: NEGATIVE MG/DL
LEUKOCYTE ESTERASE UR QL STRIP: NEGATIVE
LIPASE SERPL-CCNC: 35 U/L (ref 13–60)
LYMPHOCYTES # BLD AUTO: 1.4 10E3/UL (ref 0.8–5.3)
LYMPHOCYTES NFR BLD AUTO: 15 %
MCH RBC QN AUTO: 31.4 PG (ref 26.5–33)
MCHC RBC AUTO-ENTMCNC: 33.6 G/DL (ref 31.5–36.5)
MCV RBC AUTO: 94 FL (ref 78–100)
MONOCYTES # BLD AUTO: 0.7 10E3/UL (ref 0–1.3)
MONOCYTES NFR BLD AUTO: 7 %
MUCOUS THREADS #/AREA URNS LPF: PRESENT /LPF
NEUTROPHILS # BLD AUTO: 7.1 10E3/UL (ref 1.6–8.3)
NEUTROPHILS NFR BLD AUTO: 76 %
NITRATE UR QL: NEGATIVE
NRBC # BLD AUTO: 0 10E3/UL
NRBC BLD AUTO-RTO: 0 /100
PH UR STRIP: 7 [PH] (ref 5–7)
PLATELET # BLD AUTO: 295 10E3/UL (ref 150–450)
POTASSIUM SERPL-SCNC: 3.3 MMOL/L (ref 3.4–5.3)
PROT SERPL-MCNC: 7.8 G/DL (ref 6.4–8.3)
RBC # BLD AUTO: 4.49 10E6/UL (ref 3.8–5.2)
RBC URINE: 6 /HPF
SODIUM SERPL-SCNC: 138 MMOL/L (ref 135–145)
SP GR UR STRIP: 1.01 (ref 1–1.03)
SQUAMOUS EPITHELIAL: <1 /HPF
UROBILINOGEN UR STRIP-MCNC: NORMAL MG/DL
WBC # BLD AUTO: 9.3 10E3/UL (ref 4–11)
WBC URINE: 1 /HPF

## 2024-08-19 PROCEDURE — 74177 CT ABD & PELVIS W/CONTRAST: CPT

## 2024-08-19 PROCEDURE — 80053 COMPREHEN METABOLIC PANEL: CPT | Performed by: STUDENT IN AN ORGANIZED HEALTH CARE EDUCATION/TRAINING PROGRAM

## 2024-08-19 PROCEDURE — 99284 EMERGENCY DEPT VISIT MOD MDM: CPT | Performed by: STUDENT IN AN ORGANIZED HEALTH CARE EDUCATION/TRAINING PROGRAM

## 2024-08-19 PROCEDURE — 85025 COMPLETE CBC W/AUTO DIFF WBC: CPT | Performed by: STUDENT IN AN ORGANIZED HEALTH CARE EDUCATION/TRAINING PROGRAM

## 2024-08-19 PROCEDURE — 250N000011 HC RX IP 250 OP 636: Performed by: STUDENT IN AN ORGANIZED HEALTH CARE EDUCATION/TRAINING PROGRAM

## 2024-08-19 PROCEDURE — 93010 ELECTROCARDIOGRAM REPORT: CPT | Performed by: STUDENT IN AN ORGANIZED HEALTH CARE EDUCATION/TRAINING PROGRAM

## 2024-08-19 PROCEDURE — 36415 COLL VENOUS BLD VENIPUNCTURE: CPT | Performed by: STUDENT IN AN ORGANIZED HEALTH CARE EDUCATION/TRAINING PROGRAM

## 2024-08-19 PROCEDURE — 85610 PROTHROMBIN TIME: CPT | Performed by: STUDENT IN AN ORGANIZED HEALTH CARE EDUCATION/TRAINING PROGRAM

## 2024-08-19 PROCEDURE — 258N000003 HC RX IP 258 OP 636: Performed by: STUDENT IN AN ORGANIZED HEALTH CARE EDUCATION/TRAINING PROGRAM

## 2024-08-19 PROCEDURE — 81001 URINALYSIS AUTO W/SCOPE: CPT | Performed by: STUDENT IN AN ORGANIZED HEALTH CARE EDUCATION/TRAINING PROGRAM

## 2024-08-19 PROCEDURE — 250N000009 HC RX 250: Performed by: STUDENT IN AN ORGANIZED HEALTH CARE EDUCATION/TRAINING PROGRAM

## 2024-08-19 PROCEDURE — 83690 ASSAY OF LIPASE: CPT | Performed by: STUDENT IN AN ORGANIZED HEALTH CARE EDUCATION/TRAINING PROGRAM

## 2024-08-19 PROCEDURE — 96361 HYDRATE IV INFUSION ADD-ON: CPT | Performed by: STUDENT IN AN ORGANIZED HEALTH CARE EDUCATION/TRAINING PROGRAM

## 2024-08-19 PROCEDURE — 93005 ELECTROCARDIOGRAM TRACING: CPT | Performed by: STUDENT IN AN ORGANIZED HEALTH CARE EDUCATION/TRAINING PROGRAM

## 2024-08-19 PROCEDURE — 96360 HYDRATION IV INFUSION INIT: CPT | Mod: 59 | Performed by: STUDENT IN AN ORGANIZED HEALTH CARE EDUCATION/TRAINING PROGRAM

## 2024-08-19 PROCEDURE — 99285 EMERGENCY DEPT VISIT HI MDM: CPT | Mod: 25 | Performed by: STUDENT IN AN ORGANIZED HEALTH CARE EDUCATION/TRAINING PROGRAM

## 2024-08-19 RX ORDER — IOPAMIDOL 755 MG/ML
68 INJECTION, SOLUTION INTRAVASCULAR ONCE
Status: COMPLETED | OUTPATIENT
Start: 2024-08-19 | End: 2024-08-19

## 2024-08-19 RX ADMIN — IOPAMIDOL 68 ML: 755 INJECTION, SOLUTION INTRAVENOUS at 12:05

## 2024-08-19 RX ADMIN — SODIUM CHLORIDE 1000 ML: 9 INJECTION, SOLUTION INTRAVENOUS at 11:19

## 2024-08-19 RX ADMIN — SODIUM CHLORIDE 57 ML: 9 INJECTION, SOLUTION INTRAVENOUS at 12:05

## 2024-08-19 ASSESSMENT — COLUMBIA-SUICIDE SEVERITY RATING SCALE - C-SSRS
2. HAVE YOU ACTUALLY HAD ANY THOUGHTS OF KILLING YOURSELF IN THE PAST MONTH?: NO
1. IN THE PAST MONTH, HAVE YOU WISHED YOU WERE DEAD OR WISHED YOU COULD GO TO SLEEP AND NOT WAKE UP?: NO
6. HAVE YOU EVER DONE ANYTHING, STARTED TO DO ANYTHING, OR PREPARED TO DO ANYTHING TO END YOUR LIFE?: NO

## 2024-08-19 ASSESSMENT — ACTIVITIES OF DAILY LIVING (ADL)
ADLS_ACUITY_SCORE: 35
ADLS_ACUITY_SCORE: 35

## 2024-08-19 NOTE — ED PROVIDER NOTES
"  History     Chief Complaint   Patient presents with    Abdominal Pain     Lower abdominal pain since Thursday night     HPI  Christie Jackson is a 64 year old female who presents to the department for evaluation of lower abdominal pelvic region pain since Thursday.  Patient explains that overnight she developed severe and constant lower abdominal and pelvic pain bilaterally which has persisted since onset.  Pain seems to worsen with movement and ambulation, she also has an urge to defecate but describes small nonbloody stool passing.  She is without dysuria or hematuria which has been consistent with her urinary tract infections in the past.  She denies headache, dizziness, chest pain, cough, shortness of breath, upper abdominal pain, flank pain, or other concerning features.  She last took acetaminophen yesterday for pain.        Allergies:  Allergies   Allergen Reactions    Gramineae Pollens Other (See Comments)     Mold        Problem List:    Patient Active Problem List    Diagnosis Date Noted    Allergic rhinitis 06/18/2012     Priority: Medium    Essential hypertension 09/04/2007     Priority: Medium        Past Medical History:    No past medical history on file.    Past Surgical History:    No past surgical history on file.    Family History:    No family history on file.    Social History:  Marital Status:   [2]        Medications:    amoxicillin-clavulanate (AUGMENTIN) 875-125 MG tablet  cetirizine (ZYRTEC) 10 MG tablet  ciprofloxacin (CIPRO) 500 MG tablet  hydrochlorothiazide (HYDRODIURIL) 25 MG tablet  lisinopril (ZESTRIL) 5 MG tablet  omeprazole (PRILOSEC) 20 MG DR capsule          Review of Systems   ROS: 10 point ROS neg other than the symptoms noted above in the HPI.    Physical Exam   BP: (!) 160/90  Pulse: 116  Temp: 98.1  F (36.7  C)  Resp: 18  Height: 162.6 cm (5' 4\")  Weight: 62.6 kg (138 lb)  SpO2: 98 %      Physical Exam  Constitutional:  Well developed, well nourished.  Appears nontoxic " and in no acute distress.  Resting comfortably on the gurney.  HENT:  Normocephalic and atraumatic.  Symmetric in appearance.  Eyes:  Conjunctivae are normal.  Cardiovascular:  No cyanosis.  Borderline tachycardia with regular rhythm.  No audible murmurs noted.    Respiratory:  Effort normal without sign of respiratory distress.  No audible wheezing or stridor.  CTAB.   Gastrointestinal:  Soft nondistended abdomen.  Bilateral lower quadrant tenderness with guarding.  No rigidity or rebound tenderness.  Negative Hernandez's sign.  Negative McBurney's point.    Musculoskeletal:  Moves extremities spontaneously.  Neurological:  Patient is alert.  Skin:  Skin is warm and dry.  Psychiatric:  Normal mood and affect.    ED Course        Procedures                EKG Interpretation:      Interpreted by: Barrie Joshi  Time reviewed: Upon completion    Symptoms at time of EKG: Lower abdominal and pelvic pain  Rhythm: Sinus  Rate: 111 bpm  Axis: Normal    Conduction: None atypical   ST Segments/ T Waves: No pathologic ST-elevations or T-wave abnormalities.  Q Waves: None    Clinical Impression: No sign of ischemia            Results for orders placed or performed during the hospital encounter of 08/19/24 (from the past 24 hour(s))   CBC with platelets differential    Narrative    The following orders were created for panel order CBC with platelets differential.  Procedure                               Abnormality         Status                     ---------                               -----------         ------                     CBC with platelets and d...[[id number]]                      Final result                 Please view results for these tests on the individual orders.   INR   Result Value Ref Range    INR 1.03 0.85 - 1.15   Comprehensive metabolic panel   Result Value Ref Range    Sodium 138 135 - 145 mmol/L    Potassium 3.3 (L) 3.4 - 5.3 mmol/L    Carbon Dioxide (CO2) 30 (H) 22 - 29 mmol/L    Anion Gap 11 7 - 15  mmol/L    Urea Nitrogen 15.2 8.0 - 23.0 mg/dL    Creatinine 0.70 0.51 - 0.95 mg/dL    GFR Estimate >90 >60 mL/min/1.73m2    Calcium 9.7 8.8 - 10.4 mg/dL    Chloride 97 (L) 98 - 107 mmol/L    Glucose 143 (H) 70 - 99 mg/dL    Alkaline Phosphatase 122 40 - 150 U/L     (H) 0 - 45 U/L     (H) 0 - 50 U/L    Protein Total 7.8 6.4 - 8.3 g/dL    Albumin 4.3 3.5 - 5.2 g/dL    Bilirubin Total 0.6 <=1.2 mg/dL   Lipase   Result Value Ref Range    Lipase 35 13 - 60 U/L   [place] Draw    Narrative    The following orders were created for panel order [place] Draw.  Procedure                               Abnormality         Status                     ---------                               -----------         ------                     Extra Red Top Tube[[id number]]                               Final result                 Please view results for these tests on the individual orders.   CBC with platelets and differential   Result Value Ref Range    WBC Count 9.3 4.0 - 11.0 10e3/uL    RBC Count 4.49 3.80 - 5.20 10e6/uL    Hemoglobin 14.1 11.7 - 15.7 g/dL    Hematocrit 42.0 35.0 - 47.0 %    MCV 94 78 - 100 fL    MCH 31.4 26.5 - 33.0 pg    MCHC 33.6 31.5 - 36.5 g/dL    RDW 11.9 10.0 - 15.0 %    Platelet Count 295 150 - 450 10e3/uL    % Neutrophils 76 %    % Lymphocytes 15 %    % Monocytes 7 %    % Eosinophils 1 %    % Basophils 0 %    % Immature Granulocytes 0 %    NRBCs per 100 WBC 0 <1 /100    Absolute Neutrophils 7.1 1.6 - 8.3 10e3/uL    Absolute Lymphocytes 1.4 0.8 - 5.3 10e3/uL    Absolute Monocytes 0.7 0.0 - 1.3 10e3/uL    Absolute Eosinophils 0.1 0.0 - 0.7 10e3/uL    Absolute Basophils 0.0 0.0 - 0.2 10e3/uL    Absolute Immature Granulocytes 0.0 <=0.4 10e3/uL    Absolute NRBCs 0.0 10e3/uL   Extra Red Top Tube   Result Value Ref Range    Hold Specimen [facility]    UA with Microscopic reflex to Culture    Specimen: Urine, Clean Catch   Result Value Ref Range    Color Urine Yellow Colorless, Straw, Light Yellow, Yellow     Appearance Urine Clear Clear    Glucose Urine Negative Negative mg/dL    Bilirubin Urine Negative Negative    Ketones Urine Negative Negative mg/dL    Specific Gravity Urine 1.015 1.003 - 1.035    Blood Urine Negative Negative    pH Urine 7.0 5.0 - 7.0    Protein Albumin Urine Negative Negative mg/dL    Urobilinogen Urine Normal Normal, 2.0 mg/dL    Nitrite Urine Negative Negative    Leukocyte Esterase Urine Negative Negative    Mucus Urine Present (A) None Seen /LPF    RBC Urine 6 (H) <=2 /HPF    WBC Urine 1 <=5 /HPF    Squamous Epithelials Urine <1 <=1 /HPF    Narrative    Urine Culture not indicated   CT Abdomen Pelvis w Contrast    Narrative    CT ABDOMEN PELVIS W CONTRAST 8/19/2024 12:15 PM    CLINICAL HISTORY: Lower abdominal and pelvic pain of 4 days    TECHNIQUE: CT scan of the abdomen and pelvis was performed following  injection of IV contrast. Multiplanar reformats were obtained. Dose  reduction techniques were used.  CONTRAST: 68mL Isovue-370    COMPARISON: None.    FINDINGS:   LOWER CHEST: Normal.    HEPATOBILIARY: Normal.    PANCREAS: Normal.    SPLEEN: Normal.    ADRENAL GLANDS: Normal.    KIDNEYS/BLADDER: Normal.    BOWEL: Focal wall thickening of the mid sigmoid colon. Inflammation  involving the colon and the adjacent fat is centered on a  diverticulum. No abscess or obstruction. Normal appendix.    PELVIC ORGANS: Hysterectomy.    ADDITIONAL FINDINGS: None.    MUSCULOSKELETAL: Normal.      Impression    IMPRESSION:   1.  Moderate acute uncomplicated sigmoid colonic diverticulitis.    RO PÉREZ MD         SYSTEM ID:  [id number]       Medications   sodium chloride 0.9% BOLUS 1,000 mL (1,000 mLs Intravenous $New Bag 8/19/24 1119)   iopamidol (ISOVUE-370) solution 68 mL (68 mLs Intravenous $Given 8/19/24 1205)   sodium chloride 0.9 % bag 500mL for CT scan flush use (57 mLs Intravenous $Given 8/19/24 1205)       Assessments & Plan (with Medical Decision Making)   Christie Jackson is a 64 year old female  who presents to the department for evaluation of bilateral lower abdominal pain with tenderness to palpation.  She arrived to department afebrile and hemodynamically stable, nontoxic in appearance.  She recalls previous colonoscopies without report of diverticulosis but none on file within EMR.  Lab work overall reassuring.  CT scan of abdomen/pelvis independently viewed and agree with radiologist read that there appears to be diverticulitis without perforation or abscess formation.  Given this is her first bout of diverticulitis and no significant allergies, will start Augmentin with instructions for outpatient follow-up.        Disclaimer:  This note consists of symbols derived from keyboarding, dictation, and/or voice recognition software.  As a result, there may be errors in the script that have gone undetected.  Please consider this when interpreting information found in the chart.      I have reviewed the nursing notes.    I have reviewed the findings, diagnosis, plan and need for follow up with the patient.          New Prescriptions    AMOXICILLIN-CLAVULANATE (AUGMENTIN) 875-125 MG TABLET    Take 1 tablet by mouth 2 times daily for 10 days       Final diagnoses:   Diverticulitis of colon       8/19/2024   [facility] EMERGENCY DEPT       Barrie Joshi DO  08/19/24 7892     none

## 2024-08-19 NOTE — ED TRIAGE NOTES
Pt report lower abdominal pain that woke pt up on Thursday night. The pain is a stabbing constant pain that gets worse and better. Denies nausea. Pt does also report some heartburn and some SOB at times. Pt said she has a lot of phlegm with a dry cough. When asked about abnormal heart rhythms, pt said he heart has been racing sometimes. HR in triage 118-128.      Triage Assessment (Adult)       Row Name 08/19/24 1033 08/19/24 1032       Triage Assessment    Airway WDL WDL WDL       Respiratory WDL    Respiratory WDL X;rhythm/pattern WDL    Rhythm/Pattern, Respiratory shortness of breath  reports SOB and shallow breaths at times --       Cardiac WDL    Cardiac WDL X;rhythm X;rhythm    Pulse Rate & Regularity tachycardic tachycardic       Cognitive/Neuro/Behavioral WDL    Cognitive/Neuro/Behavioral WDL WDL --

## 2025-03-01 ENCOUNTER — HEALTH MAINTENANCE LETTER (OUTPATIENT)
Age: 65
End: 2025-03-01